# Patient Record
Sex: FEMALE | Race: BLACK OR AFRICAN AMERICAN | NOT HISPANIC OR LATINO | Employment: FULL TIME | URBAN - METROPOLITAN AREA
[De-identification: names, ages, dates, MRNs, and addresses within clinical notes are randomized per-mention and may not be internally consistent; named-entity substitution may affect disease eponyms.]

---

## 2017-03-30 ENCOUNTER — OFFICE VISIT (OUTPATIENT)
Dept: OBSTETRICS AND GYNECOLOGY | Facility: CLINIC | Age: 22
End: 2017-03-30
Payer: MEDICAID

## 2017-03-30 VITALS
SYSTOLIC BLOOD PRESSURE: 112 MMHG | BODY MASS INDEX: 23.9 KG/M2 | DIASTOLIC BLOOD PRESSURE: 64 MMHG | HEIGHT: 56 IN | WEIGHT: 106.25 LBS

## 2017-03-30 DIAGNOSIS — N92.1 MENOMETRORRHAGIA: Primary | ICD-10-CM

## 2017-03-30 PROCEDURE — 99212 OFFICE O/P EST SF 10 MIN: CPT | Mod: PBBFAC,PO | Performed by: ADVANCED PRACTICE MIDWIFE

## 2017-03-30 PROCEDURE — 99999 PR PBB SHADOW E&M-EST. PATIENT-LVL II: CPT | Mod: PBBFAC,,, | Performed by: ADVANCED PRACTICE MIDWIFE

## 2017-03-30 PROCEDURE — 99203 OFFICE O/P NEW LOW 30 MIN: CPT | Mod: S$PBB,,, | Performed by: ADVANCED PRACTICE MIDWIFE

## 2017-03-30 NOTE — PROGRESS NOTES
"S: here for complaints of lower abdominal pain "cysts caused by the thing in my arm" Wants nexplanon removed.  Reports that implant was placed 2014 in [place] after the birth of her daughter. She has had heavy and irregular periods since placement. Before nexplanon she was taking depo. Undecided on next method of birth control. Partner present.  O: NAD, alert and oriented.   A: menometrrorhagia secondary to progesterone only implant  P: Will have her return next week for procedure visit to have it removed  Counseled on birth control options and patient remains undecided. Will discuss again next week.   "

## 2017-03-30 NOTE — MR AVS SNAPSHOT
[facility]  [address] [place] [address]  Phone: [phone]  Fax: [phone]                  Richie Pereira   3/30/2017 8:00 AM   Office Visit    Description:  Female : 1995   Provider:  Kaelyn Shirley CNM   Department:  [facility]           Reason for Visit     Gynecologic Exam                To Do List           Future Appointments        Provider Department Dept Phone    2017 1:15 PM [facility] [phone]      Goals (5 Years of Data)     None      [facility]     [facility] Nurse Care Line -  Assistance  Unless otherwise directed by your provider, please contact [facility], our nurse care line that is available for  assistance.     Registered nurses in the [facility] provide: appointment scheduling, clinical advisement, health education, and other advisory services.  Call: [phone] (toll free)               Medications           Message regarding Medications     Verify the changes and/or additions to your medication regime listed below are the same as discussed with your clinician today.  If any of these changes or additions are incorrect, please notify your healthcare provider.        STOP taking these medications     cyproheptadine (PERIACTIN) 4 mg tablet Take 1 tablet (4 mg total) by mouth 3 (three) times daily as needed.    ketorolac (TORADOL) 10 mg tablet Take 1 tablet (10 mg total) by mouth every 6 (six) hours.    promethazine (PHENERGAN) 25 MG tablet Take 1 tablet (25 mg total) by mouth every 6 (six) hours as needed for Nausea.    naproxen (NAPROSYN) 500 MG tablet Take 1 tablet (500 mg total) by mouth 2 (two) times daily with meals. Do not take with naproxen           Verify that the below list of medications is an accurate representation of the medications you are currently taking.  If none reported, the list may be blank. If incorrect, please contact your healthcare provider. Carry  "this list with you in case of emergency.           Current Medications            Clinical Reference Information           Your Vitals Were     BP Height Weight Last Period BMI    112/64 4' 8" (1.422 m) 48.2 kg (106 lb 4.2 oz) 03/23/2017 (Approximate) 23.82 kg/m2      Blood Pressure          Most Recent Value    BP  112/64      Allergies as of 3/30/2017     No Known Allergies      Immunizations Administered on Date of Encounter - 3/30/2017     None      [facility] Sign-Up     Activating your [facility] account is as easy as 1-2-3!     1) Visit [email or web address], select Sign Up Now, enter this activation code and your date of birth, then select Next.  [email or web address]  Expires: 5/14/2017  8:48 AM      2) Create a username and password to use when you visit [facility] in the future and select a security question in case you lose your password and select Next.    3) Enter your e-mail address and click Sign Up!    Additional Information  If you have questions, please e-mail [email or web address] or call [phone] to talk to our [facility] staff. Remember, [facility] is NOT to be used for urgent needs. For medical emergencies, dial 911.         Language Assistance Services     ATTENTION: Language assistance services are available, free of charge. Please call [phone].      ATENCIÓN: Si habla español, tiene a palma disposición servicios gratuitos de asistencia lingüística. Llame al [phone].     [facility] Ý: N?u b?n nói Ti?ng Vi?t, có các d?ch v? h? tr? ngôn ng? mi?n phí dành cho b?n. G?i s? [phone].         [facility] complies with applicable Federal civil rights laws and does not discriminate on the basis of race, color, national origin, age, disability, or sex.        "

## 2017-04-04 ENCOUNTER — HOSPITAL ENCOUNTER (EMERGENCY)
Facility: HOSPITAL | Age: 22
Discharge: HOME | End: 2017-04-04
Payer: MEDICAID

## 2017-04-04 VITALS
BODY MASS INDEX: 23.84 KG/M2 | TEMPERATURE: 98 F | RESPIRATION RATE: 18 BRPM | HEART RATE: 86 BPM | OXYGEN SATURATION: 97 % | WEIGHT: 106 LBS | SYSTOLIC BLOOD PRESSURE: 120 MMHG | HEIGHT: 56 IN | DIASTOLIC BLOOD PRESSURE: 65 MMHG

## 2017-04-04 DIAGNOSIS — K29.00 ACUTE GASTRITIS WITHOUT HEMORRHAGE, UNSPECIFIED GASTRITIS TYPE: Primary | ICD-10-CM

## 2017-04-04 PROCEDURE — 99283 EMERGENCY DEPT VISIT LOW MDM: CPT

## 2017-04-04 RX ORDER — DICYCLOMINE HYDROCHLORIDE 20 MG/1
20 TABLET ORAL 2 TIMES DAILY
Qty: 20 TABLET | Refills: 0 | Status: SHIPPED | OUTPATIENT
Start: 2017-04-04 | End: 2017-05-04

## 2017-04-04 NOTE — ED AVS SNAPSHOT
[facility]  [address]  [place] [address]               Richie Pereira   2017  7:45 PM   ED    Description:  Female : 1995   Department:  [facility]            Your Care was Coordinated By:     Provider Role From To    CONSTANCE Carlson Physician Assistant 17 1011 --      Reason for Visit     Diarrhea           Diagnoses this Visit        Comments    Acute gastritis without hemorrhage, unspecified gastritis type    -  Primary       ED Disposition     ED Disposition Condition Comment    Discharge             To Do List           Follow-up Information     Follow up with Kaelyn Shirley CNM In 2 days.    Specialty:  Obstetrics and Gynecology    Why:  As needed, If symptoms worsen return to ED     Contact information:    [address] [facility] [address]  [place] [address]  [phone]         These Medications        Disp Refills Start End    dicyclomine (BENTYL) 20 mg tablet 20 tablet 0 2017    Take 1 tablet (20 mg total) by mouth 2 (two) times daily. - Oral      [facility]     [facility] Nurse Care Line -  Assistance  Unless otherwise directed by your provider, please contact [facility], our nurse care line that is available for  assistance.     Registered nurses in the [facility] provide: appointment scheduling, clinical advisement, health education, and other advisory services.  Call: [phone] (toll free)               Medications           Message regarding Medications     Verify the changes and/or additions to your medication regime listed below are the same as discussed with your clinician today.  If any of these changes or additions are incorrect, please notify your healthcare provider.        START taking these NEW medications        Refills    dicyclomine (BENTYL) 20 mg tablet 0    Sig: Take 1 tablet (20 mg total) by mouth 2 (two) times daily.    Class: Print    Route:  "Oral           Verify that the below list of medications is an accurate representation of the medications you are currently taking.  If none reported, the list may be blank. If incorrect, please contact your healthcare provider. Carry this list with you in case of emergency.           Current Medications     dicyclomine (BENTYL) 20 mg tablet Take 1 tablet (20 mg total) by mouth 2 (two) times daily.           Clinical Reference Information           Your Vitals Were     BP Pulse Temp Resp Height Weight    120/65 (BP Location: Right arm, Patient Position: Sitting) 86 98.3 °F (36.8 °C) (Oral) 18 4' 8" (1.422 m) 48.1 kg (106 lb)    Last Period SpO2 BMI          03/23/2017 (Approximate) 97% 23.76 kg/m2        Allergies as of 4/4/2017     No Known Allergies      Immunizations Administered on Date of Encounter - 4/4/2017     None      ED Micro, Lab, POCT     None      ED Imaging Orders     None        Discharge Instructions         Understanding Gastritis    Gastritis is a painful inflammation of the stomach lining. It has a number of causes. Gastritis and its symptoms can be relieved with treatment. Work with your healthcare provider to find ways to treat your symptoms.  The Stomach  To digest the food you eat, your stomach makes strong acids and enzymes. A healthy stomach has built-in defenses that protect its lining from damage by these acids and enzymes.  When you have gastritis  Acids may damage the stomach lining when the built-in defenses of the stomach dont function as they should. The stomach lining can then become inflamed. When this happens, it is called gastritis.  Causes of gastritis  Gastritis has many causes. They may include:  · Aspirin and anti-inflammatory medicines  · Tobacco use  · Alcohol use  · Helicobacter pylori (H. pylori) bacteria  · Trauma from injuries, burns, or major surgery  · Critical illness or autoimmune disorders  Common symptoms  With gastritis, you may notice one or more of the " following:  · A burning feeling in your upper belly  · Pain that happens after eating certain foods  · Gas or a bloated feeling in your stomach  · Frequent belching  · Nausea with or without vomiting  · Loss of appetite  · Feeling full quickly  · Fatigue  Date Last Reviewed: 7/1/2016  © 1520-6183 [employer]. [address] [place] [address]. All rights reserved. This information is not intended as a substitute for professional medical care. Always follow your healthcare professional's instructions.          Your Scheduled Appointments     Apr 07, 2017  1:15 PM CDT   Procedure with LUCINA George - OB/ GYN ([facility])    [address]  [place] [address] [phone]              [facility] Sign-Up     Activating your [facility] account is as easy as 1-2-3!     1) Visit [email or web address], select Sign Up Now, enter this activation code and your date of birth, then select Next.  [email or web address]  Expires: 5/14/2017  8:48 AM      2) Create a username and password to use when you visit [facility] in the future and select a security question in case you lose your password and select Next.    3) Enter your e-mail address and click Sign Up!    Additional Information  If you have questions, please e-mail [email or web address] or call [phone] to talk to our [facility] staff. Remember, [facility] is NOT to be used for urgent needs. For medical emergencies, dial 911.          [facility] complies with applicable Federal civil rights laws and does not discriminate on the basis of race, color, national origin, age, disability, or sex.        Language Assistance Services     ATTENTION: Language assistance services are available, free of charge. Please call [phone].      ATENCIÓN: Si habla español, tiene a palma disposición servicios gratuitos de asistencia lingüística. Llame al [phone].     CHÚ Ý: N?u b?n nói Ti?ng Vi?t, có các d?ch v? h? tr? ngôn ng?  mi?n phí dành cho b?n. G?i s? [phone].

## 2017-04-05 NOTE — DISCHARGE INSTRUCTIONS
Understanding Gastritis    Gastritis is a painful inflammation of the stomach lining. It has a number of causes. Gastritis and its symptoms can be relieved with treatment. Work with your healthcare provider to find ways to treat your symptoms.  The Stomach  To digest the food you eat, your stomach makes strong acids and enzymes. A healthy stomach has built-in defenses that protect its lining from damage by these acids and enzymes.  When you have gastritis  Acids may damage the stomach lining when the built-in defenses of the stomach dont function as they should. The stomach lining can then become inflamed. When this happens, it is called gastritis.  Causes of gastritis  Gastritis has many causes. They may include:  · Aspirin and anti-inflammatory medicines  · Tobacco use  · Alcohol use  · Helicobacter pylori (H. pylori) bacteria  · Trauma from injuries, burns, or major surgery  · Critical illness or autoimmune disorders  Common symptoms  With gastritis, you may notice one or more of the following:  · A burning feeling in your upper belly  · Pain that happens after eating certain foods  · Gas or a bloated feeling in your stomach  · Frequent belching  · Nausea with or without vomiting  · Loss of appetite  · Feeling full quickly  · Fatigue  Date Last Reviewed: 7/1/2016  © 6471-7679 [employer]. [address] [place] [address], [place] [address]. All rights reserved. This information is not intended as a substitute for professional medical care. Always follow your healthcare professional's instructions.

## 2017-04-05 NOTE — ED PROVIDER NOTES
SCRIBE #1 NOTE: I, Abelino Oliveira, am scribing for, and in the presence of, CONSTANCE Baker. I have scribed the entire note.      History      Chief Complaint   Patient presents with    Diarrhea     reports having 5 episodes of diarrhea today reports niece had same thing this weekend        Review of patient's allergies indicates:  No Known Allergies     HPI   HPI    2017, 7:58 PM   History obtained from the patient      History of Present Illness: Richie Pereira is a 21 y.o. female patient who presents to the Emergency Department for diarrhea (5 episodes) which onset gradually today. Sx are episodic and moderate in severity. Sx are described as watery diarrhea. There are no mitigating or exacerbating factors noted. Associated sx include cramping to lower abd.  Pt denies any fever, chills, N/V, CP, SOB, blood in stool, and all other sx at this time. Pt reports niece had same sx this past weekend. No recent use of abx. No further complaints or concerns at this time.         Arrival mode: Personal vehicle      PCP: Kaelyn Shirley CNM       Past Medical History:  Past Medical History:   Diagnosis Date    Abnormal Pap smear of cervix     history abnormal pap 2014    Mental disorder     reports mood swings few months       Past Surgical History:  Past Surgical History:   Procedure Laterality Date     SECTION           Family History:  Family History   Problem Relation Age of Onset    Stroke Maternal Grandmother     Diabetes Maternal Grandfather     No Known Problems Father     No Known Problems Mother     No Known Problems Brother     Sickle cell trait Sister        Social History:  Social History     Social History Main Topics    Smoking status: Never Smoker    Smokeless tobacco: Never Used    Alcohol use No    Drug use: Yes     Special: Marijuana    Sexual activity: Yes     Partners: Male     Birth control/ protection: Implant       ROS   Review of Systems   Constitutional: Negative  "for chills and fever.   HENT: Negative for sore throat and trouble swallowing.    Respiratory: Negative for cough and shortness of breath.    Cardiovascular: Negative for chest pain.   Gastrointestinal: Positive for diarrhea. Negative for blood in stool, constipation, nausea and vomiting.        + abd cramping   Genitourinary: Negative for dysuria.   Musculoskeletal: Negative for back pain.   Skin: Negative for rash and wound.   Neurological: Negative for weakness and numbness.   Hematological: Does not bruise/bleed easily.   All other systems reviewed and are negative.      Physical Exam    Initial Vitals   BP Pulse Resp Temp SpO2   04/04/17 1900 04/04/17 1900 04/04/17 1900 04/04/17 1900 04/04/17 1900   120/65 86 18 98.3 °F (36.8 °C) 97 %      Physical Exam  Nursing Notes and Vital Signs Reviewed.  Constitutional: Patient is in no acute distress. Awake and alert. Well-developed and well-nourished.  Head: Atraumatic. Normocephalic.  Eyes: PERRL. EOM intact. Conjunctivae are not pale. No scleral icterus.  ENT: Mucous membranes are moist. Oropharynx is clear and symmetric.    Neck: Supple. Full ROM. No lymphadenopathy.  Cardiovascular: Regular rate. Regular rhythm. No murmurs, rubs, or gallops.    Pulmonary/Chest: No respiratory distress. Clear to auscultation bilaterally. No wheezing, rales, or rhonchi.  Abdominal: Soft and non-distended.  There is no tenderness.  No rebound, guarding, or rigidity.  Good bowel sounds.    Musculoskeletal: Moves all extremities. No obvious deformities. No edema.   Skin: Warm and dry.  Neurological:  Alert, awake, and appropriate.  Normal speech.  No acute focal neurological deficits are appreciated.  Psychiatric: Normal affect. Good eye contact. Appropriate in content.    ED Course    Procedures  ED Vital Signs:  Vitals:    04/04/17 1900   BP: 120/65   Pulse: 86   Resp: 18   Temp: 98.3 °F (36.8 °C)   TempSrc: Oral   SpO2: 97%   Weight: 48.1 kg (106 lb)   Height: 4' 8" (1.422 m) "       The Emergency Provider reviewed the vital signs and test results, which are outlined above.    ED Discussion     8:06 PM: Discussed with pt all pertinent ED information. Discussed pt dx and plan of tx. Gave pt all f/u and return to the ED instructions. All questions and concerns were addressed at this time. Pt expresses understanding of information and instructions, and is comfortable with plan to discharge. Pt is stable for discharge.      I discussed with patient and/or family/caretaker that evaluation in the ED does not suggest any emergent or life threatening medical conditions requiring immediate intervention beyond what was provided in the ED, and I believe patient is safe for discharge.  Regardless, an unremarkable evaluation in the ED does not preclude the development or presence of a serious of life threatening condition. As such, patient was instructed to return immediately for any worsening or change in current symptoms.      ED Medication(s):  Medications - No data to display    Discharge Medication List as of 4/4/2017  8:08 PM      START taking these medications    Details   dicyclomine (BENTYL) 20 mg tablet Take 1 tablet (20 mg total) by mouth 2 (two) times daily., Starting 4/4/2017, Until u 5/4/17, Print             Follow-up Information     Follow up with Kaelyn Shirley CNM In 2 days.    Specialty:  Obstetrics and Gynecology    Why:  As needed, If symptoms worsen return to ED     Contact information:    [address]  [place] [address]  [phone]               Medical Decision Making              Scribe Attestation:   Scribe #1: I performed the above scribed service and the documentation accurately describes the services I performed. I attest to the accuracy of the note.    APC:   APC Attestation Statement for Scribe #1: I, CONSTANCE Baker, personally performed the services described in this documentation, as scribed by Abelino Oliveira in my presence, and it is both accurate and  complete.          Clinical Impression       ICD-10-CM ICD-9-CM   1. Acute gastritis without hemorrhage, unspecified gastritis type K29.00 535.00       Disposition:   Disposition: Discharged  Condition: Stable         CONSTANCE Carlson  04/05/17 0119

## 2017-04-07 ENCOUNTER — PROCEDURE VISIT (OUTPATIENT)
Dept: OBSTETRICS AND GYNECOLOGY | Facility: CLINIC | Age: 22
End: 2017-04-07
Payer: MEDICAID

## 2017-04-07 VITALS
WEIGHT: 105.81 LBS | DIASTOLIC BLOOD PRESSURE: 70 MMHG | HEIGHT: 56 IN | SYSTOLIC BLOOD PRESSURE: 100 MMHG | BODY MASS INDEX: 23.8 KG/M2

## 2017-04-07 DIAGNOSIS — Z30.9 ENCOUNTER FOR CONTRACEPTIVE MANAGEMENT, UNSPECIFIED TYPE: Primary | ICD-10-CM

## 2017-04-07 PROCEDURE — 96372 THER/PROPH/DIAG INJ SC/IM: CPT | Mod: PBBFAC,PO

## 2017-04-07 PROCEDURE — 11982 REMOVE DRUG IMPLANT DEVICE: CPT | Mod: PBBFAC,PO | Performed by: ADVANCED PRACTICE MIDWIFE

## 2017-04-07 PROCEDURE — 11982 REMOVE DRUG IMPLANT DEVICE: CPT | Mod: S$PBB,,, | Performed by: ADVANCED PRACTICE MIDWIFE

## 2017-04-07 RX ORDER — MEDROXYPROGESTERONE ACETATE 150 MG/ML
150 INJECTION, SUSPENSION INTRAMUSCULAR
Status: ACTIVE | OUTPATIENT
Start: 2017-04-07 | End: 2018-04-02

## 2017-04-07 RX ADMIN — MEDROXYPROGESTERONE ACETATE 150 MG: 150 INJECTION, SUSPENSION INTRAMUSCULAR at 02:04

## 2017-04-07 NOTE — NURSING
Pt. Had Nexplanon removed today and wants depo provera as contraception. Depo Provera injection given. Pt. Tolerated well. Instructed patient to wait 15 minutes in clinic. Pt. Voiced understanding. Made next appointment and gave copy of AVS.

## 2017-04-07 NOTE — PROCEDURES
Procedures     S: Here for nexplanon removal. Discussed future birth control options and pt wants Depo. Explained procedure, pt consented.  O: Pt placed in supine position with left arm up resting on the bed. Nexplanon marked, betadine used to clean area. Lidocaine with epinephrine used to numb area. Pt tolerated well. #11 blade used to make small incision. Nexplanon removed with forceps. Steri strip placed. Wrapped with kerlix.   A: Normal Nexplanon removal  Contraception management  P: Depo Provera injection today and once every 3 months with first dose today.

## 2017-04-07 NOTE — MR AVS SNAPSHOT
[facility]  [address] [place] [address]  Phone: [phone]  Fax: [phone]                  Richie Pereira   2017 1:15 PM   Procedure visit    Description:  Female : 1995   Provider:  Kaelyn Shirley CNM   Department:  [facility]           Diagnoses this Visit        Comments    Encounter for contraceptive management, unspecified type    -  Primary            To Do List           Future Appointments        Provider Department Dept Phone    2017 10:30 AM OB GYN NURSE, [facility] - OB/ -[phone]      Goals (5 Years of Data)     None      [facility]     [facility] Nurse Care Line -  Assistance  Unless otherwise directed by your provider, please contact [facility], our nurse care line that is available for  assistance.     Registered nurses in the [facility] provide: appointment scheduling, clinical advisement, health education, and other advisory services.  Call: [phone] (toll free)               Medications           Message regarding Medications     Verify the changes and/or additions to your medication regime listed below are the same as discussed with your clinician today.  If any of these changes or additions are incorrect, please notify your healthcare provider.        These medications were administered today        Dose Freq    medroxyPROGESTERone (DEPO-PROVERA) injection 150 mg 150 mg Every 3 months    Sig: Inject 1 mL (150 mg total) into the muscle every 3 (three) months.    Class: Normal    Route: Intramuscular           Verify that the below list of medications is an accurate representation of the medications you are currently taking.  If none reported, the list may be blank. If incorrect, please contact your healthcare provider. Carry this list with you in case of emergency.           Current Medications     dicyclomine (BENTYL) 20 mg tablet Take 1 tablet (20 mg total) by mouth 2 (two) times daily.     "       Clinical Reference Information           Your Vitals Were     BP Height Weight Last Period BMI    100/70 4' 8" (1.422 m) 48 kg (105 lb 13.1 oz) 03/23/2017 (Approximate) 23.72 kg/m2      Blood Pressure          Most Recent Value    BP  100/70      Allergies as of 4/7/2017     No Known Allergies      Immunizations Administered on Date of Encounter - 4/7/2017     None      [facility] Sign-Up     Activating your [facility] account is as easy as 1-2-3!     1) Visit [email or web address], select Sign Up Now, enter this activation code and your date of birth, then select Next.  [email or web address]  Expires: 5/14/2017  8:48 AM      2) Create a username and password to use when you visit [facility] in the future and select a security question in case you lose your password and select Next.    3) Enter your e-mail address and click Sign Up!    Additional Information  If you have questions, please e-mail [email or web address] or call [phone] to talk to our [facility] staff. Remember, [facility] is NOT to be used for urgent needs. For medical emergencies, dial 911.         Language Assistance Services     ATTENTION: Language assistance services are available, free of charge. Please call [phone].      ATENCIÓN: Si habla español, tiene a palma disposición servicios gratuitos de asistencia lingüística. Llame al [phone].     CHÚ Ý: N?u b?n nói Ti?ng Vi?t, có các d?ch v? h? tr? ngôn ng? mi?n phí dành cho b?n. G?i s? [phone].         [facility] complies with applicable Federal civil rights laws and does not discriminate on the basis of race, color, national origin, age, disability, or sex.        "

## 2017-07-07 ENCOUNTER — TELEPHONE (OUTPATIENT)
Dept: OBSTETRICS AND GYNECOLOGY | Facility: CLINIC | Age: 22
End: 2017-07-07

## 2017-07-07 ENCOUNTER — CLINICAL SUPPORT (OUTPATIENT)
Dept: OBSTETRICS AND GYNECOLOGY | Facility: CLINIC | Age: 22
End: 2017-07-07
Payer: MEDICAID

## 2017-07-07 PROCEDURE — 99999 PR PBB SHADOW E&M-EST. PATIENT-LVL I: CPT | Mod: PBBFAC,,,

## 2017-07-07 PROCEDURE — 99211 OFF/OP EST MAY X REQ PHY/QHP: CPT | Mod: PBBFAC,PO

## 2017-07-07 RX ADMIN — MEDROXYPROGESTERONE ACETATE 150 MG: 150 INJECTION, SUSPENSION INTRAMUSCULAR at 01:07

## 2017-07-07 NOTE — TELEPHONE ENCOUNTER
Attempted to call pt regards to rescheduling nurse visit no answer, due to no voicemail set up could not leave message.

## 2017-07-07 NOTE — TELEPHONE ENCOUNTER
----- Message from Marie Segura sent at 7/7/2017  7:33 AM CDT -----  Contact: Patient  Patient missed her depo shot yesterday and would like to come in today for it,, please call her back at [phone]. Thank you

## 2017-07-07 NOTE — PROGRESS NOTES
Pt. Came in for depo provera injection. Pt. Tolerated well. Instructed patient to wait in clinic for 15 minutes. Made next appt. And gave copy of AVS.

## 2017-07-07 NOTE — TELEPHONE ENCOUNTER
----- Message from Kirstin Black sent at 7/7/2017  8:01 AM CDT -----  Contact: Pt   Pt missed her depo yesterday and is requesting to come today. Pt can be reached at [phone] (home)

## 2017-07-07 NOTE — TELEPHONE ENCOUNTER
----- Message from Homa Dalia sent at 7/7/2017 11:25 AM CDT -----  Pt at [phone]//states she had missed her nurse appt for her depo injection//is calling to ask if possible to come in today to get it//please call//thanks/[facility]

## 2018-03-29 ENCOUNTER — TELEPHONE (OUTPATIENT)
Dept: OBSTETRICS AND GYNECOLOGY | Facility: CLINIC | Age: 23
End: 2018-03-29

## 2018-03-29 NOTE — TELEPHONE ENCOUNTER
Spoke with the patient and she went to the ER for irregular bleeding UPT was negative. Her last depo shot was 7/2017 and she has had irregular bleeding since then. I informed the patient that she will need to schedule a GYN appointment. Appointment scheduled for 4/10/18 @ 2:45 pm with Vivienne Lopez NP.

## 2018-03-29 NOTE — TELEPHONE ENCOUNTER
----- Message from Cat Lafleur sent at 3/29/2018  7:58 AM CDT -----  Contact: pt  Please call pt @ [phone], pt states she been spotting, possible pregnant, pt need to know what to do.

## 2018-04-10 ENCOUNTER — LAB VISIT (OUTPATIENT)
Dept: LAB | Facility: HOSPITAL | Age: 23
End: 2018-04-10
Attending: NURSE PRACTITIONER
Payer: MEDICAID

## 2018-04-10 ENCOUNTER — OFFICE VISIT (OUTPATIENT)
Dept: OBSTETRICS AND GYNECOLOGY | Facility: CLINIC | Age: 23
End: 2018-04-10
Payer: MEDICAID

## 2018-04-10 VITALS
WEIGHT: 110.25 LBS | SYSTOLIC BLOOD PRESSURE: 131 MMHG | BODY MASS INDEX: 24.8 KG/M2 | HEIGHT: 56 IN | DIASTOLIC BLOOD PRESSURE: 73 MMHG

## 2018-04-10 DIAGNOSIS — N92.1 MENORRHAGIA WITH IRREGULAR CYCLE: ICD-10-CM

## 2018-04-10 DIAGNOSIS — N92.1 MENORRHAGIA WITH IRREGULAR CYCLE: Primary | ICD-10-CM

## 2018-04-10 LAB
BASOPHILS # BLD AUTO: 0.03 K/UL
BASOPHILS NFR BLD: 0.4 %
DIFFERENTIAL METHOD: ABNORMAL
EOSINOPHIL # BLD AUTO: 0.1 K/UL
EOSINOPHIL NFR BLD: 1.1 %
ERYTHROCYTE [DISTWIDTH] IN BLOOD BY AUTOMATED COUNT: 12.8 %
HCG INTACT+B SERPL-ACNC: <2 MIU/ML
HCT VFR BLD AUTO: 37.8 %
HGB BLD-MCNC: 12.7 G/DL
LYMPHOCYTES # BLD AUTO: 3.6 K/UL
LYMPHOCYTES NFR BLD: 48.9 %
MCH RBC QN AUTO: 29.7 PG
MCHC RBC AUTO-ENTMCNC: 33.6 G/DL
MCV RBC AUTO: 89 FL
MONOCYTES # BLD AUTO: 0.6 K/UL
MONOCYTES NFR BLD: 8 %
NEUTROPHILS # BLD AUTO: 3.1 K/UL
NEUTROPHILS NFR BLD: 41.6 %
PLATELET # BLD AUTO: 364 K/UL
PMV BLD AUTO: 9 FL
RBC # BLD AUTO: 4.27 M/UL
WBC # BLD AUTO: 7.34 K/UL

## 2018-04-10 PROCEDURE — 81025 URINE PREGNANCY TEST: CPT | Mod: PBBFAC,PO | Performed by: NURSE PRACTITIONER

## 2018-04-10 PROCEDURE — 99213 OFFICE O/P EST LOW 20 MIN: CPT | Mod: PBBFAC,PO | Performed by: NURSE PRACTITIONER

## 2018-04-10 PROCEDURE — 99999 PR PBB SHADOW E&M-EST. PATIENT-LVL III: CPT | Mod: PBBFAC,,, | Performed by: NURSE PRACTITIONER

## 2018-04-10 PROCEDURE — 85025 COMPLETE CBC W/AUTO DIFF WBC: CPT | Mod: PO

## 2018-04-10 PROCEDURE — 84702 CHORIONIC GONADOTROPIN TEST: CPT | Mod: PO

## 2018-04-10 PROCEDURE — 99214 OFFICE O/P EST MOD 30 MIN: CPT | Mod: S$PBB,,, | Performed by: NURSE PRACTITIONER

## 2018-04-10 PROCEDURE — 36415 COLL VENOUS BLD VENIPUNCTURE: CPT | Mod: PO

## 2018-04-10 RX ORDER — NAPROXEN 500 MG/1
500 TABLET ORAL
COMMUNITY
Start: 2018-03-29 | End: 2023-09-07 | Stop reason: SDUPTHER

## 2018-04-10 RX ORDER — MEDROXYPROGESTERONE ACETATE 150 MG/ML
150 INJECTION, SUSPENSION INTRAMUSCULAR
Status: ACTIVE | OUTPATIENT
Start: 2018-04-10 | End: 2019-10-02

## 2018-04-10 RX ORDER — METHOCARBAMOL 750 MG/1
750 TABLET, FILM COATED ORAL
COMMUNITY
Start: 2018-03-29 | End: 2023-09-07

## 2018-04-10 RX ORDER — MEDROXYPROGESTERONE ACETATE 10 MG/1
10 TABLET ORAL DAILY
Qty: 10 TABLET | Refills: 0 | Status: SHIPPED | OUTPATIENT
Start: 2018-04-10 | End: 2018-04-10

## 2018-04-10 RX ADMIN — MEDROXYPROGESTERONE ACETATE 150 MG: 150 INJECTION, SUSPENSION INTRAMUSCULAR at 03:04

## 2018-04-10 NOTE — PROGRESS NOTES
"    Richie Pereira is a 22 y.o. female  presents for heavy bleeding with irregularity.  Has been so since off last injection of depo provera in July.  Pt has been to [facility] ER few times for different pelvic pain issues. States had us done over a month ago showed ovarian cyst on left.  Bleeding heavy today with clotting.  Would like to get back on depo provera.   LMP: Patient's last menstrual period was 2018 (approximate)..       Past Medical History:   Diagnosis Date    Abnormal Pap smear of cervix     history abnormal pap 2014    Mental disorder     reports mood swings few months     Past Surgical History:   Procedure Laterality Date     SECTION       Social History     Social History    Marital status: Single     Spouse name: N/A    Number of children: N/A    Years of education: N/A     Occupational History    Not on file.     Social History Main Topics    Smoking status: Never Smoker    Smokeless tobacco: Never Used    Alcohol use No    Drug use: Yes     Types: Marijuana    Sexual activity: Yes     Partners: Male     Other Topics Concern    Not on file     Social History Narrative    No narrative on file     Family History   Problem Relation Age of Onset    Stroke Maternal Grandmother     Diabetes Maternal Grandfather     No Known Problems Father     No Known Problems Mother     No Known Problems Brother     Sickle cell trait Sister      OB History      Para Term  AB Living    1 1   1   1    SAB TAB Ectopic Multiple Live Births            1          /73   Ht 4' 8" (1.422 m)   Wt 50 kg (110 lb 3.7 oz)   LMP 2018 (Approximate)   BMI 24.71 kg/m²       ROS:  Per hpi   PHYSICAL EXAM:  APPEARANCE: Well nourished, well developed, in no acute distress.  AFFECT: WNL, alert and oriented x 3  SKIN: No acne or hirsutism  Deferred today  Physical Exam    1. Menorrhagia with irregular cycle  US Pelvis Comp with Transvag NON-OB (xpd    CBC auto " differential    hCG, quantitative    medroxyPROGESTERone (DEPO-PROVERA) syringe 150 mg    DISCONTINUED: medroxyPROGESTERone (PROVERA) 10 MG tablet    AND PLAN:    upt negative in office today  Will check labs and us  Start depo provera  See back after u/s and pending on bleeding - needs pap

## 2018-04-10 NOTE — PROGRESS NOTES
Two patient identifiers verified. Pt notified to wait in clinic for 15 minutes after receiving injection, pt verbalized understanding. 150 mg of Depo-Provera administered IM to patient left ventrogluteal. Pt tolerated well. Next injection scheduled for 07/03/18. Pt verbalized understanding.

## 2018-04-11 ENCOUNTER — TELEPHONE (OUTPATIENT)
Dept: OBSTETRICS AND GYNECOLOGY | Facility: CLINIC | Age: 23
End: 2018-04-11

## 2018-04-11 NOTE — TELEPHONE ENCOUNTER
----- Message from Vivienne Barros NP sent at 4/11/2018 11:17 AM CDT -----  Labs were normal, keep her u/s appointment

## 2018-04-16 ENCOUNTER — TELEPHONE (OUTPATIENT)
Dept: RADIOLOGY | Facility: HOSPITAL | Age: 23
End: 2018-04-16

## 2018-04-17 ENCOUNTER — HOSPITAL ENCOUNTER (OUTPATIENT)
Dept: RADIOLOGY | Facility: HOSPITAL | Age: 23
Discharge: HOME | End: 2018-04-17
Attending: NURSE PRACTITIONER
Payer: MEDICAID

## 2018-04-17 DIAGNOSIS — N92.1 MENORRHAGIA WITH IRREGULAR CYCLE: ICD-10-CM

## 2018-04-17 PROCEDURE — 76856 US EXAM PELVIC COMPLETE: CPT | Mod: 26,,, | Performed by: RADIOLOGY

## 2018-04-17 PROCEDURE — 76830 TRANSVAGINAL US NON-OB: CPT | Mod: 26,,, | Performed by: RADIOLOGY

## 2018-04-17 PROCEDURE — 76830 TRANSVAGINAL US NON-OB: CPT | Mod: TC,PO

## 2018-04-19 ENCOUNTER — TELEPHONE (OUTPATIENT)
Dept: OBSTETRICS AND GYNECOLOGY | Facility: CLINIC | Age: 23
End: 2018-04-19

## 2018-04-19 NOTE — TELEPHONE ENCOUNTER
----- Message from Vivienne Barros NP sent at 4/19/2018 12:58 PM CDT -----  Pelvic u/s was normal and both ovaries were both normal - follow up as needed

## 2018-04-20 NOTE — TELEPHONE ENCOUNTER
Attempted to contact patient. No answer. Left message for patient to return call to clinic to [phone].

## 2018-07-05 ENCOUNTER — TELEPHONE (OUTPATIENT)
Dept: OBSTETRICS AND GYNECOLOGY | Facility: CLINIC | Age: 23
End: 2018-07-05

## 2018-07-05 NOTE — TELEPHONE ENCOUNTER
----- Message from Cat Lafleur sent at 7/5/2018  7:36 AM CDT -----  Contact: pt  Please call pt @ [phone], pt would like to come in today for Depo appt around 10am, pt missed appt on 7/3.

## 2023-09-07 ENCOUNTER — HOSPITAL ENCOUNTER (EMERGENCY)
Facility: HOSPITAL | Age: 28
Discharge: HOME | End: 2023-09-07
Attending: EMERGENCY MEDICINE
Payer: MEDICAID

## 2023-09-07 VITALS
RESPIRATION RATE: 16 BRPM | OXYGEN SATURATION: 100 % | SYSTOLIC BLOOD PRESSURE: 108 MMHG | HEART RATE: 78 BPM | WEIGHT: 127.44 LBS | BODY MASS INDEX: 28.67 KG/M2 | HEIGHT: 56 IN | DIASTOLIC BLOOD PRESSURE: 70 MMHG | TEMPERATURE: 98 F

## 2023-09-07 DIAGNOSIS — R07.89 CHEST WALL PAIN: Primary | ICD-10-CM

## 2023-09-07 DIAGNOSIS — R07.9 CHEST PAIN: ICD-10-CM

## 2023-09-07 PROCEDURE — 96372 THER/PROPH/DIAG INJ SC/IM: CPT | Performed by: EMERGENCY MEDICINE

## 2023-09-07 PROCEDURE — 93010 ELECTROCARDIOGRAM REPORT: CPT | Mod: ,,, | Performed by: INTERNAL MEDICINE

## 2023-09-07 PROCEDURE — 99284 EMERGENCY DEPT VISIT MOD MDM: CPT | Mod: 25

## 2023-09-07 PROCEDURE — 93010 PR ELECTROCARDIOGRAM REPORT: ICD-10-PCS | Mod: ,,, | Performed by: INTERNAL MEDICINE

## 2023-09-07 PROCEDURE — 63600175 PHARM REV CODE 636 W HCPCS: Performed by: EMERGENCY MEDICINE

## 2023-09-07 PROCEDURE — 93005 ELECTROCARDIOGRAM TRACING: CPT

## 2023-09-07 RX ORDER — NAPROXEN 500 MG/1
500 TABLET ORAL 2 TIMES DAILY WITH MEALS
Qty: 20 TABLET | Refills: 0 | Status: SHIPPED | OUTPATIENT
Start: 2023-09-07

## 2023-09-07 RX ORDER — KETOROLAC TROMETHAMINE 30 MG/ML
60 INJECTION, SOLUTION INTRAMUSCULAR; INTRAVENOUS
Status: COMPLETED | OUTPATIENT
Start: 2023-09-07 | End: 2023-09-07

## 2023-09-07 RX ADMIN — KETOROLAC TROMETHAMINE 60 MG: 30 INJECTION, SOLUTION INTRAMUSCULAR at 10:09

## 2023-09-07 NOTE — ED PROVIDER NOTES
SCRIBE #1 NOTE: I, Jeane Lindo, am scribing for, and in the presence of, Kaia Wall MD. I have scribed the entire note.      History      Chief Complaint   Patient presents with    Chest Pain     Pt reports stabbing pain to middle of chest that began this AM        Review of patient's allergies indicates:  No Known Allergies     HPI   HPI    2023, 10:27 AM   History obtained from the patient      History of Present Illness: Richie Pereira is a 28 y.o. female patient who presents to the Emergency Department for mid-sternal CP which onset this morning. The pt reports that she tripped and fell down stairs yesterday and hit her chest during the fall. She also reports that she injured her R thigh during the fall. Now, she c/o CP. Symptoms are constant and moderate in severity. Pt's pain is worse with movement and when she takes a deep breath. Associated sxs include ecchymosis to the R thigh. Patient denies any N/V/D, fever, chills, coughing, gait problems, and all other sxs at this time. No prior Tx reported. Pt also reports that she is supposed to take medication for anxiety. No further complaints or concerns at this time.         Arrival mode: Personal vehicle     PCP: No, Primary Doctor       Past Medical History:  Past Medical History:   Diagnosis Date    Abnormal Pap smear of cervix     history abnormal pap 2014    Mental disorder     reports mood swings few months       Past Surgical History:  Past Surgical History:   Procedure Laterality Date     SECTION           Family History:  Family History   Problem Relation Age of Onset    Stroke Maternal Grandmother     Diabetes Maternal Grandfather     No Known Problems Father     No Known Problems Mother     No Known Problems Brother     Sickle cell trait Sister        Social History:  Social History     Tobacco Use    Smoking status: Never    Smokeless tobacco: Never   Substance and Sexual Activity    Alcohol use: No    Drug use: Yes      Types: Marijuana    Sexual activity: Yes     Partners: Male       ROS   Review of Systems   Constitutional:  Negative for chills and fever.   HENT:  Negative for sore throat.    Respiratory:  Negative for cough and shortness of breath.    Cardiovascular:  Positive for chest pain.   Gastrointestinal:  Negative for diarrhea, nausea and vomiting.   Genitourinary:  Negative for dysuria.   Musculoskeletal:  Negative for back pain and gait problem.   Skin:  Positive for color change (ecchymosis to the R thigh). Negative for rash.   Neurological:  Negative for weakness.   Hematological:  Does not bruise/bleed easily.   All other systems reviewed and are negative.      Physical Exam      Initial Vitals [09/07/23 1012]   BP Pulse Resp Temp SpO2   113/68 88 18 98.2 °F (36.8 °C) 100 %      MAP       --          Physical Exam  Nursing Notes and Vital Signs Reviewed.  Constitutional: Patient is in no acute distress. Well-developed and well-nourished.  Head: Atraumatic. Normocephalic.  Eyes: PERRL. EOM intact. Conjunctivae are not pale. No scleral icterus.  ENT: Mucous membranes are moist. Oropharynx is clear and symmetric.    Neck: Supple. Full ROM. No lymphadenopathy.  Cardiovascular: Regular rate. Regular rhythm. No murmurs, rubs, or gallops. Distal pulses are 2+ and symmetric.  Pulmonary/Chest: There is reproducible mid-chest wall tenderness. No respiratory distress. Clear to auscultation bilaterally. No wheezing or rales.  Abdominal: Soft and non-distended.  There is no tenderness.  No rebound, guarding, or rigidity.   Musculoskeletal: Moves all extremities. No obvious bony deformities. No edema. No calf tenderness. Pt can move her RLE without difficulty.  Skin: There is a small contusion to th R mid-thigh.  Neurological:  Alert, awake, and appropriate.  Normal speech.  No acute focal neurological deficits are appreciated.  Psychiatric: Normal affect. Good eye contact. Appropriate in content.    ED Course    Procedures  ED  "Vital Signs:  Vitals:    09/07/23 1012   BP: 113/68   Pulse: 88   Resp: 18   Temp: 98.2 °F (36.8 °C)   TempSrc: Oral   SpO2: 100%   Weight: 57.8 kg (127 lb 6.8 oz)   Height: 4' 8" (1.422 m)       Abnormal Lab Results:  Labs Reviewed   HIV 1 / 2 ANTIBODY   HEPATITIS C ANTIBODY   HEP C VIRUS HOLD SPECIMEN          Imaging Results:  Imaging Results              X-Ray Chest PA And Lateral (Final result)  Result time 09/07/23 11:37:47      Final result by Mona León MD (Timothy) (09/07/23 11:37:47)                   Impression:      No acute findings.      Electronically signed by: Mona León MD  Date:    09/07/2023  Time:    11:37               Narrative:    EXAMINATION:  XR CHEST PA AND LATERAL    CLINICAL HISTORY  <Diagnosis>,    COMPARISON:  None    FINDINGS:  Two views.    The heart size is normal.  The lung fields are clear.  No acute cardiopulmonary infiltrative.                        Final result by Mona León MD (Timothy) (09/07/23 11:37:47)                   Impression:      No acute findings.      Electronically signed by: Mona León MD  Date:    09/07/2023  Time:    11:37               Narrative:    EXAMINATION:  XR CHEST PA AND LATERAL    CLINICAL HISTORY  <Diagnosis>,    COMPARISON:  None    FINDINGS:  Two views.    The heart size is normal.  The lung fields are clear.  No acute cardiopulmonary infiltrative.                                     The EKG was ordered, reviewed, and independently interpreted by the ED provider.  Interpretation time: 10:14  Rate: 84 BPM  Rhythm: normal sinus rhythm with sinus arrhythmia   Interpretation: No acute ST changes. No STEMI.             The Emergency Provider reviewed the vital signs and test results, which are outlined above.    ED Discussion     11:50 AM: Reassessed pt at this time. Discussed with pt all pertinent ED information and results. Discussed pt dx and plan of tx. Gave pt all f/u and return to the ED instructions. All questions and " concerns were addressed at this time. Pt expresses understanding of information and instructions, and is comfortable with plan to discharge. Pt is stable for discharge.    I discussed with patient and/or family/caretaker that evaluation in the ED does not suggest any emergent or life threatening medical conditions requiring immediate intervention beyond what was provided in the ED, and I believe patient is safe for discharge.  Regardless, an unremarkable evaluation in the ED does not preclude the development or presence of a serious of life threatening condition. As such, patient was instructed to return immediately for any worsening or change in current symptoms.           ED Medication(s):  Medications   ketorolac injection 60 mg (60 mg Intramuscular Given 9/7/23 4399)        Follow-up Information       [facility]. Schedule an appointment as soon as possible for a visit in 2 days.    Why: Return to the Emergency Room, If symptoms worsen  Contact information:  [address] [facility]  [place] [address]  [phone]                             Current Discharge Medication List            Medical Decision Making    Medical Decision Making  Costocondritis  Pneumonia  Bronchitis    Patient with reproducible mid chest wall pain, lungs clear, vital signs normal, ECG reviewed and normal, CXR reviewed and normal, no concerns for ACS or PE, or Dissection, given toradol with improvement, no indication for lab work, stable for discharge.     Amount and/or Complexity of Data Reviewed  Radiology: ordered. Decision-making details documented in ED Course.  ECG/medicine tests: ordered. Decision-making details documented in ED Course.    Risk  Prescription drug management.                Scribe Attestation:   Scribe #1: I performed the above scribed service and the documentation accurately describes the services I performed. I attest to the accuracy of the note.    Attending:   Physician Attestation Statement for  Scribe #1: I, Kaia Wall MD, personally performed the services described in this documentation, as scribed by Jeane Lindo, in my presence, and it is both accurate and complete.          Clinical Impression       ICD-10-CM ICD-9-CM   1. Chest wall pain  R07.89 786.52   2. Chest pain  R07.9 786.50       Disposition:   Disposition: Discharged  Condition: Stable         Kaia Wall MD  09/07/23 1158

## 2023-09-07 NOTE — Clinical Note
Dion accompanied their caregiver to the emergency department on 9/7/2023. They may return to work on 09/08/2023.      If you have any questions or concerns, please don't hesitate to call.      Jena OLMOS

## 2023-09-07 NOTE — Clinical Note
"Richie Justice" Kelli was seen and treated in our emergency department on 9/7/2023.  She may return to work on 09/09/2023.       If you have any questions or concerns, please don't hesitate to call.      Jena OLMOS    "

## 2023-12-14 ENCOUNTER — HOSPITAL ENCOUNTER (EMERGENCY)
Facility: HOSPITAL | Age: 28
Discharge: HOME | End: 2023-12-14
Attending: EMERGENCY MEDICINE
Payer: MEDICAID

## 2023-12-14 VITALS
WEIGHT: 137.13 LBS | TEMPERATURE: 99 F | HEART RATE: 92 BPM | OXYGEN SATURATION: 100 % | RESPIRATION RATE: 16 BRPM | BODY MASS INDEX: 30.74 KG/M2 | DIASTOLIC BLOOD PRESSURE: 68 MMHG | SYSTOLIC BLOOD PRESSURE: 114 MMHG

## 2023-12-14 DIAGNOSIS — H66.92 LEFT OTITIS MEDIA, UNSPECIFIED OTITIS MEDIA TYPE: Primary | ICD-10-CM

## 2023-12-14 PROCEDURE — 99284 EMERGENCY DEPT VISIT MOD MDM: CPT

## 2023-12-14 RX ORDER — IBUPROFEN 800 MG/1
800 TABLET ORAL EVERY 6 HOURS PRN
Qty: 20 TABLET | Refills: 0 | Status: SHIPPED | OUTPATIENT
Start: 2023-12-14

## 2023-12-14 RX ORDER — AMOXICILLIN 875 MG/1
875 TABLET, FILM COATED ORAL 2 TIMES DAILY
Qty: 14 TABLET | Refills: 0 | Status: SHIPPED | OUTPATIENT
Start: 2023-12-14

## 2023-12-14 NOTE — ED PROVIDER NOTES
Encounter Date: 2023       History     Chief Complaint   Patient presents with    Otalgia     Pt states her left ear has been hurting for 3 weeks now.     Patient is a 28-year-old female who presents with left ear pain x3 weeks.  Patient denies any injury the ear.  Denies any cough, congestion, runny nose.  No medications have been taken for relief of symptoms.  Patient shows no signs of distress at this time.      Review of patient's allergies indicates:  No Known Allergies  Past Medical History:   Diagnosis Date    Abnormal Pap smear of cervix     history abnormal pap 2014    Mental disorder     reports mood swings few months     Past Surgical History:   Procedure Laterality Date     SECTION       Family History   Problem Relation Age of Onset    Stroke Maternal Grandmother     Diabetes Maternal Grandfather     No Known Problems Father     No Known Problems Mother     No Known Problems Brother     Sickle cell trait Sister      Social History     Tobacco Use    Smoking status: Never    Smokeless tobacco: Never   Substance Use Topics    Alcohol use: No    Drug use: Yes     Types: Marijuana     Review of Systems   Constitutional:  Negative for fever.   HENT:  Positive for ear pain (left). Negative for sore throat.    Respiratory:  Negative for shortness of breath.    Cardiovascular:  Negative for chest pain.   Gastrointestinal:  Negative for nausea.   Genitourinary:  Negative for dysuria.   Musculoskeletal:  Negative for back pain.   Skin:  Negative for rash.   Neurological:  Negative for weakness.   Hematological:  Does not bruise/bleed easily.       Physical Exam     Initial Vitals [23 1729]   BP Pulse Resp Temp SpO2   114/68 92 16 98.7 °F (37.1 °C) 100 %      MAP       --         Physical Exam    Nursing note and vitals reviewed.  Constitutional: She appears well-developed and well-nourished.   HENT:   Head: Normocephalic and atraumatic.   Left Ear: Tympanic membrane is erythematous.   Eyes:  EOM are normal. Pupils are equal, round, and reactive to light.   Neck: Neck supple.   Normal range of motion.  Cardiovascular:  Normal rate, regular rhythm, normal heart sounds and intact distal pulses.           Pulmonary/Chest: Breath sounds normal.   Abdominal: Abdomen is soft. Bowel sounds are normal.   Musculoskeletal:         General: Normal range of motion.      Cervical back: Normal range of motion and neck supple.     Neurological: She is alert and oriented to person, place, and time. She has normal strength and normal reflexes.   Skin: Skin is warm and dry.         ED Course   Procedures  Labs Reviewed - No data to display       Imaging Results    None          Medications - No data to display  Medical Decision Making  Patient informed of exam results.  Instructed take medications as prescribed and follow-up with primary care physician.  Patient to return to the emergency room with any worsening symptoms.  No distress noted at time of discharge.                                      Clinical Impression:  Final diagnoses:  [H66.92] Left otitis media, unspecified otitis media type (Primary)          ED Disposition Condition    Discharge Stable          ED Prescriptions       Medication Sig Dispense Start Date End Date Auth. Provider    amoxicillin (AMOXIL) 875 MG tablet Take 1 tablet (875 mg total) by mouth 2 (two) times daily. 14 tablet 12/14/2023 -- Medardo Loyd NP    ibuprofen (ADVIL,MOTRIN) 800 MG tablet Take 1 tablet (800 mg total) by mouth every 6 (six) hours as needed for Pain. 20 tablet 12/14/2023 -- Medardo Loyd NP          Follow-up Information    None          Medardo Loyd NP  12/14/23 1024